# Patient Record
Sex: FEMALE | Race: WHITE | Employment: FULL TIME | URBAN - METROPOLITAN AREA
[De-identification: names, ages, dates, MRNs, and addresses within clinical notes are randomized per-mention and may not be internally consistent; named-entity substitution may affect disease eponyms.]

---

## 2019-10-23 LAB
ALBUMIN SERPL-MCNC: 4.5 G/DL (ref 3.4–5)
ALP BLD-CCNC: 45 U/L (ref 40–129)
ALT SERPL-CCNC: 14 U/L (ref 10–40)
ANION GAP SERPL CALCULATED.3IONS-SCNC: 15 MMOL/L (ref 3–16)
AST SERPL-CCNC: 16 U/L (ref 15–37)
BASOPHILS ABSOLUTE: 0 K/UL (ref 0–0.2)
BASOPHILS RELATIVE PERCENT: 0.4 %
BILIRUB SERPL-MCNC: <0.2 MG/DL (ref 0–1)
BILIRUBIN DIRECT: <0.2 MG/DL (ref 0–0.3)
BILIRUBIN, INDIRECT: NORMAL MG/DL (ref 0–1)
BUN BLDV-MCNC: 17 MG/DL (ref 7–20)
CALCIUM SERPL-MCNC: 9.7 MG/DL (ref 8.3–10.6)
CHLORIDE BLD-SCNC: 100 MMOL/L (ref 99–110)
CO2: 24 MMOL/L (ref 21–32)
CREAT SERPL-MCNC: 0.5 MG/DL (ref 0.6–1.1)
EOSINOPHILS ABSOLUTE: 0.1 K/UL (ref 0–0.6)
EOSINOPHILS RELATIVE PERCENT: 0.9 %
GFR AFRICAN AMERICAN: >60
GFR NON-AFRICAN AMERICAN: >60
GLUCOSE BLD-MCNC: 77 MG/DL (ref 70–99)
HCT VFR BLD CALC: 42.2 % (ref 36–48)
HEMOGLOBIN: 14.4 G/DL (ref 12–16)
LYMPHOCYTES ABSOLUTE: 1.1 K/UL (ref 1–5.1)
LYMPHOCYTES RELATIVE PERCENT: 20.5 %
MCH RBC QN AUTO: 29.4 PG (ref 26–34)
MCHC RBC AUTO-ENTMCNC: 34.2 G/DL (ref 31–36)
MCV RBC AUTO: 86 FL (ref 80–100)
MONOCYTES ABSOLUTE: 0.5 K/UL (ref 0–1.3)
MONOCYTES RELATIVE PERCENT: 9.1 %
NEUTROPHILS ABSOLUTE: 3.7 K/UL (ref 1.7–7.7)
NEUTROPHILS RELATIVE PERCENT: 69.1 %
PDW BLD-RTO: 12.7 % (ref 12.4–15.4)
PHOSPHORUS: 4 MG/DL (ref 2.5–4.9)
PLATELET # BLD: 277 K/UL (ref 135–450)
PLATELET SLIDE REVIEW: ADEQUATE
PMV BLD AUTO: 10.8 FL (ref 5–10.5)
POTASSIUM SERPL-SCNC: 4.5 MMOL/L (ref 3.5–5.1)
RBC # BLD: 4.91 M/UL (ref 4–5.2)
SLIDE REVIEW: ABNORMAL
SODIUM BLD-SCNC: 139 MMOL/L (ref 136–145)
TOTAL PROTEIN: 6.4 G/DL (ref 6.4–8.2)
WBC # BLD: 5.4 K/UL (ref 4–11)

## 2020-10-18 ENCOUNTER — HOSPITAL ENCOUNTER (EMERGENCY)
Age: 30
Discharge: HOME | End: 2020-10-18

## 2020-10-18 ENCOUNTER — APPOINTMENT (OUTPATIENT)
Dept: CT IMAGING | Age: 30
End: 2020-10-18

## 2020-10-18 VITALS
DIASTOLIC BLOOD PRESSURE: 66 MMHG | SYSTOLIC BLOOD PRESSURE: 115 MMHG | BODY MASS INDEX: 22.49 KG/M2 | TEMPERATURE: 98 F | WEIGHT: 135 LBS | OXYGEN SATURATION: 100 % | HEART RATE: 64 BPM | HEIGHT: 65 IN | RESPIRATION RATE: 12 BRPM

## 2020-10-18 LAB
A/G RATIO: 2 (ref 1.1–2.2)
ALBUMIN SERPL-MCNC: 3.9 G/DL (ref 3.4–5)
ALP BLD-CCNC: 40 U/L (ref 40–129)
ALT SERPL-CCNC: 11 U/L (ref 10–40)
ANION GAP SERPL CALCULATED.3IONS-SCNC: 9 MMOL/L (ref 3–16)
AST SERPL-CCNC: 22 U/L (ref 15–37)
BASOPHILS ABSOLUTE: 0.1 K/UL (ref 0–0.2)
BASOPHILS RELATIVE PERCENT: 1.1 %
BILIRUB SERPL-MCNC: <0.2 MG/DL (ref 0–1)
BILIRUBIN URINE: NEGATIVE
BLOOD, URINE: NEGATIVE
BUN BLDV-MCNC: 17 MG/DL (ref 7–20)
CALCIUM SERPL-MCNC: 9.1 MG/DL (ref 8.3–10.6)
CHLORIDE BLD-SCNC: 109 MMOL/L (ref 99–110)
CLARITY: CLEAR
CO2: 23 MMOL/L (ref 21–32)
COLOR: YELLOW
CREAT SERPL-MCNC: 0.7 MG/DL (ref 0.6–1.1)
EOSINOPHILS ABSOLUTE: 0 K/UL (ref 0–0.6)
EOSINOPHILS RELATIVE PERCENT: 0.5 %
GFR AFRICAN AMERICAN: >60
GFR NON-AFRICAN AMERICAN: >60
GLOBULIN: 2 G/DL
GLUCOSE BLD-MCNC: 99 MG/DL (ref 70–99)
GLUCOSE URINE: NEGATIVE MG/DL
HCG QUALITATIVE: NEGATIVE
HCT VFR BLD CALC: 25.7 % (ref 36–48)
HEMATOLOGY PATH CONSULT: YES
HEMOGLOBIN: 7.9 G/DL (ref 12–16)
HYPOCHROMIA: ABNORMAL
KETONES, URINE: NEGATIVE MG/DL
LEUKOCYTE ESTERASE, URINE: NEGATIVE
LIPASE: 22 U/L (ref 13–60)
LYMPHOCYTES ABSOLUTE: 1.3 K/UL (ref 1–5.1)
LYMPHOCYTES RELATIVE PERCENT: 15.5 %
MCH RBC QN AUTO: 21.3 PG (ref 26–34)
MCHC RBC AUTO-ENTMCNC: 30.7 G/DL (ref 31–36)
MCV RBC AUTO: 69.4 FL (ref 80–100)
MICROCYTES: ABNORMAL
MICROSCOPIC EXAMINATION: NORMAL
MONOCYTES ABSOLUTE: 0.8 K/UL (ref 0–1.3)
MONOCYTES RELATIVE PERCENT: 9.8 %
NEUTROPHILS ABSOLUTE: 6 K/UL (ref 1.7–7.7)
NEUTROPHILS RELATIVE PERCENT: 73.1 %
NITRITE, URINE: NEGATIVE
PDW BLD-RTO: 16.7 % (ref 12.4–15.4)
PH UA: 5.5 (ref 5–8)
PLATELET # BLD: 277 K/UL (ref 135–450)
PLATELET SLIDE REVIEW: ADEQUATE
PMV BLD AUTO: 9.9 FL (ref 5–10.5)
POLYCHROMASIA: ABNORMAL
POTASSIUM SERPL-SCNC: 4.2 MMOL/L (ref 3.5–5.1)
PROTEIN UA: NEGATIVE MG/DL
RBC # BLD: 3.7 M/UL (ref 4–5.2)
SLIDE REVIEW: ABNORMAL
SODIUM BLD-SCNC: 141 MMOL/L (ref 136–145)
SPECIFIC GRAVITY UA: >1.03 (ref 1–1.03)
TOTAL PROTEIN: 5.9 G/DL (ref 6.4–8.2)
URINE REFLEX TO CULTURE: NORMAL
URINE TYPE: NORMAL
UROBILINOGEN, URINE: 0.2 E.U./DL
WBC # BLD: 8.2 K/UL (ref 4–11)

## 2020-10-18 PROCEDURE — 84703 CHORIONIC GONADOTROPIN ASSAY: CPT

## 2020-10-18 PROCEDURE — 6360000004 HC RX CONTRAST MEDICATION: Performed by: PHYSICIAN ASSISTANT

## 2020-10-18 PROCEDURE — 2580000003 HC RX 258: Performed by: PHYSICIAN ASSISTANT

## 2020-10-18 PROCEDURE — 80053 COMPREHEN METABOLIC PANEL: CPT

## 2020-10-18 PROCEDURE — 99284 EMERGENCY DEPT VISIT MOD MDM: CPT

## 2020-10-18 PROCEDURE — 81003 URINALYSIS AUTO W/O SCOPE: CPT

## 2020-10-18 PROCEDURE — 85025 COMPLETE CBC W/AUTO DIFF WBC: CPT

## 2020-10-18 PROCEDURE — 96374 THER/PROPH/DIAG INJ IV PUSH: CPT

## 2020-10-18 PROCEDURE — 83690 ASSAY OF LIPASE: CPT

## 2020-10-18 PROCEDURE — 74177 CT ABD & PELVIS W/CONTRAST: CPT

## 2020-10-18 PROCEDURE — 6360000002 HC RX W HCPCS: Performed by: PHYSICIAN ASSISTANT

## 2020-10-18 RX ORDER — KETOROLAC TROMETHAMINE 30 MG/ML
30 INJECTION, SOLUTION INTRAMUSCULAR; INTRAVENOUS ONCE
Status: COMPLETED | OUTPATIENT
Start: 2020-10-18 | End: 2020-10-18

## 2020-10-18 RX ORDER — 0.9 % SODIUM CHLORIDE 0.9 %
1000 INTRAVENOUS SOLUTION INTRAVENOUS ONCE
Status: COMPLETED | OUTPATIENT
Start: 2020-10-18 | End: 2020-10-18

## 2020-10-18 RX ORDER — LEVOTHYROXINE SODIUM 137 UG/1
137 TABLET ORAL DAILY
COMMUNITY
Start: 2020-06-15

## 2020-10-18 RX ORDER — SULFASALAZINE 500 MG/1
1000 TABLET ORAL 4 TIMES DAILY
COMMUNITY

## 2020-10-18 RX ADMIN — SODIUM CHLORIDE 1000 ML: 9 INJECTION, SOLUTION INTRAVENOUS at 11:49

## 2020-10-18 RX ADMIN — IOPAMIDOL 75 ML: 755 INJECTION, SOLUTION INTRAVENOUS at 12:32

## 2020-10-18 RX ADMIN — KETOROLAC TROMETHAMINE 30 MG: 30 INJECTION, SOLUTION INTRAMUSCULAR at 11:49

## 2020-10-18 ASSESSMENT — PAIN DESCRIPTION - LOCATION: LOCATION: ABDOMEN

## 2020-10-18 ASSESSMENT — PAIN SCALES - GENERAL
PAINLEVEL_OUTOF10: 5
PAINLEVEL_OUTOF10: 8

## 2020-10-18 ASSESSMENT — PAIN DESCRIPTION - PAIN TYPE: TYPE: ACUTE PAIN

## 2020-10-18 ASSESSMENT — PAIN DESCRIPTION - ORIENTATION: ORIENTATION: RIGHT

## 2020-10-18 NOTE — ED PROVIDER NOTES
control use. Nursing Notes were all reviewed and agreed with or any disagreements were addressed in the HPI. REVIEW OF SYSTEMS    (2-9 systems for level 4, 10 or more for level 5)     Review of Systems    Positives and Pertinent negatives as per HPI. Except as noted above in the ROS, all other systems were reviewed and negative. PAST MEDICAL HISTORY     Past Medical History:   Diagnosis Date    Colitis     History of chicken pox 2/3/1994    Roseola 8/09    Thyroid disease          SURGICAL HISTORY     Past Surgical History:   Procedure Laterality Date    SHOULDER SURGERY           CURRENTMEDICATIONS       Discharge Medication List as of 10/18/2020  1:34 PM      CONTINUE these medications which have NOT CHANGED    Details   levothyroxine (SYNTHROID) 137 MCG tablet Take 137 mcg by mouth dailyHistorical Med      sulfaSALAzine (AZULFIDINE) 500 MG tablet Take 1,000 mg by mouth 4 times dailyHistorical Med      PROBIOTIC PRODUCT PO Take by mouthHistorical Med      COLOSTRUM PO Take by mouthHistorical Med      Nutritional Supplements (PERMEABILITY FACTORS PO) Take by mouthHistorical Med      NONFORMULARY Gi select , powder for inflammationsHistorical Med      azithromycin (ZITHROMAX Z-AILEEN) 250 MG tablet Take per directions, Disp-1 packet,R-0Normal      Norgestim-Eth Estrad Triphasic (TRINESSA, 28,) 0.18/0.215/0.25 MG-35 MCG TABS Take  by mouth. fluticasone (FLONASE) 50 MCG/ACT nasal spray 2 puffs each nostril every day before bedtime. , Disp-3 Bottle, R-3      montelukast (SINGULAIR) 10 MG tablet Take 1 tablet by mouth nightly., Disp-90 tablet, R-3               ALLERGIES     Patient has no known allergies. FAMILYHISTORY     No family history on file.        SOCIAL HISTORY       Social History     Tobacco Use    Smoking status: Never Smoker    Smokeless tobacco: Never Used   Substance Use Topics    Alcohol use: No    Drug use: No       SCREENINGS             PHYSICAL EXAM    (up to 7 for level 4, 8 or more for level 5)     ED Triage Vitals [10/18/20 1114]   BP Temp Temp src Pulse Resp SpO2 Height Weight   (!) 131/91 97.9 °F (36.6 °C) -- 91 12 100 % 5' 5\" (1.651 m) 135 lb (61.2 kg)       Physical Exam  Vitals signs and nursing note reviewed. Constitutional:       Appearance: She is well-developed. She is not diaphoretic. HENT:      Head: Atraumatic. Nose: Nose normal.   Eyes:      General:         Right eye: No discharge. Left eye: No discharge. Neck:      Musculoskeletal: Normal range of motion. Cardiovascular:      Rate and Rhythm: Normal rate and regular rhythm. Heart sounds: No murmur. No friction rub. No gallop. Pulmonary:      Effort: Pulmonary effort is normal. No respiratory distress. Breath sounds: No stridor. No wheezing, rhonchi or rales. Abdominal:      General: Bowel sounds are normal. There is no distension. Palpations: Abdomen is soft. There is no mass. Tenderness: There is no abdominal tenderness. There is no guarding or rebound. Hernia: No hernia is present. Comments: Generalized subjective tenderness without guarding, rebound or rigidity   Musculoskeletal: Normal range of motion. General: No swelling. Skin:     General: Skin is warm and dry. Findings: No erythema or rash. Neurological:      Mental Status: She is alert and oriented to person, place, and time. Cranial Nerves: No cranial nerve deficit.    Psychiatric:         Behavior: Behavior normal.         DIAGNOSTIC RESULTS   LABS:    Labs Reviewed   CBC WITH AUTO DIFFERENTIAL - Abnormal; Notable for the following components:       Result Value    RBC 3.70 (*)     Hemoglobin 7.9 (*)     Hematocrit 25.7 (*)     MCV 69.4 (*)     MCH 21.3 (*)     MCHC 30.7 (*)     RDW 16.7 (*)     Microcytes Occasional (*)     Polychromasia Occasional (*)     Hypochromia Occasional (*)     All other components within normal limits    Narrative:     Performed at:  [facility]) - [facility]  [address], [address]   Phone [phone]   COMPREHENSIVE METABOLIC PANEL - Abnormal; Notable for the following components: Total Protein 5.9 (*)     All other components within normal limits    Narrative:     Performed at:  [facility]  [address], [address]   Phone [phone]   LIPASE    Narrative:     Performed at:  [facility]  [address]   Phone [phone]   URINE RT REFLEX TO CULTURE    Narrative:     Performed at:  [facility]  [address],  [place], [address]   Phone [phone]   HCG, SERUM, QUALITATIVE    Narrative:     Performed at:  [facility]  [address] [facility],  [place], [address]   Phone [phone]       All other labs were within normal range or not returned as of this dictation. EKG: All EKG's are interpreted by the Emergency Department Physician in the absence of a cardiologist.  Please see their note for interpretation of EKG. RADIOLOGY:   Non-plain film images such as CT, Ultrasound and MRI are read by the radiologist. Plain radiographic images are visualized and preliminarily interpreted by the ED Provider with the below findings:        Interpretation per the Radiologist below, if available at the time of this note:    CT ABDOMEN PELVIS W IV CONTRAST Additional Contrast? None   Final Result   Calcifications at the base of the bladder favored to represent phleboliths. Distal ureteral stone cannot be completely excluded. Kidneys and renal collecting systems appear unremarkable. Gallbladder is contracted and otherwise unremarkable. There is a heavy stool burden.   Appendix is visualized and appears normal.      No definite inflammatory change noted of the GI tract; however, there is   fluid within the pelvis somewhat limiting evaluation. Fluid within the pelvis is felt to be gynecologic in origin likely   physiologic. If patient has pelvic pain follow-up pelvic ultrasound could   always be considered. No results found. PROCEDURES   Unless otherwise noted below, none     Procedures    CRITICAL CARE TIME   N/A    CONSULTS:  None      EMERGENCY DEPARTMENT COURSE and DIFFERENTIAL DIAGNOSIS/MDM:   Vitals:    Vitals:    10/18/20 1114 10/18/20 1330   BP: (!) 131/91 115/66   Pulse: 91 64   Resp: 12 12   Temp: 97.9 °F (36.6 °C) 98 °F (36.7 °C)   SpO2: 100% 100%   Weight: 135 lb (61.2 kg)    Height: 5' 5\" (1.651 m)        Patient was given the following medications:  Medications   0.9 % sodium chloride bolus (0 mLs Intravenous Stopped 10/18/20 1334)   ketorolac (TORADOL) injection 30 mg (30 mg Intravenous Given 10/18/20 1149)   iopamidol (ISOVUE-370) 76 % injection 75 mL (75 mLs Intravenous Given 10/18/20 1232)       PERC Rule:  Applicable in this patient who has low clinical suspicion for pulmonary embolism. Age < 48years old: Yes  Heart rate < 100 bpm: Yes  Oxygen saturation > 95%: Yes  Hemoptysis: No  Exogenous estrogen use: No  Prior history of DVT or PE: No  Unilateral leg swelling: No  Surgery or significant trauma in the past 4 weeks: No    Based on the above, PE can effectively be ruled out without further testing. Patient presented with some pleuritic epigastric and upper abdominal pain that radiated around her flank that started suddenly while she was giving her child a bath this morning. After Toradol and recheck patient denies any pain has a repeat benign abdominal exam.  She has known history of ulcerative colitis and has chronic bloody stools but states that this may have been worse over the past 1 month or so. She does have some anemia with a hemoglobin of 7.9 but is not dizzy and vitals are unremarkable. She is never required blood transfusion. This is been ongoing for a month.   She is repeat benign abdominal exam.  There is question of possible phlebolith versus kidney stone given her sudden onset of symptoms with some flank pain initial concern was for possible kidney stone. She was educated on the possibility of this but given that she is pain-free she can follow-up as an outpatient. She will follow-up with her GI doctor given her increased bleeding and anemia. We will follow-up with urology. And return here for any worsening of symptoms or problems at home. Low suspicion for acute cholecystitis, pancreatitis, obstruction, perforated viscus, diverticulitis or other emergent etiology. She understood if at any point she begins to have fevers, constant vomiting or feeling dizzy like she may pass out she would need to return immediately to the emergency department. She was stable time of discharge. FINAL IMPRESSION      1. Pain of upper abdomen    2.  Anemia, unspecified type          DISPOSITION/PLAN   DISPOSITION        PATIENT REFERREDTO:  Conrad Valencia MD  [address] [place]  [phone]    Schedule an appointment as soon as possible for a visit   3-5 days, As needed    [facility] Emergency Department  [address] [place] [address]  [phone]    As needed      DISCHARGE MEDICATIONS:  Discharge Medication List as of 10/18/2020  1:34 PM          DISCONTINUED MEDICATIONS:  Discharge Medication List as of 10/18/2020  1:34 PM                 (Please note that portions of this note were completed with a voice recognition program.  Efforts were made to edit the dictations but occasionally words are mis-transcribed.)    Noy Espinal PA-C (electronically signed)            Noy Espinal PA-C  10/18/20 [address] [facility] CARLOS [place]  11/02/20 1817

## 2020-10-19 LAB — HEMATOLOGY PATH CONSULT: NORMAL

## 2020-10-23 NOTE — PROGRESS NOTES
Patient not reached. Preop instructions left on voice mail.  [phone]______________    -[id number]__[id number]______[id number]___________  -Nothing to eat or drink after midnight  -Responsible adult 25 or older to stay on site while you are here and drive you home and stay with you after  -Follow any instructions your doctors office has given you  -Bring a complete list of all your medications and supplements  -If you normally take the following medications in the morning please do so with a small    sip of water-heart,blood pressure,seizure,breathing or thyroid-avoid water pilll Do not take blood pressure medications ending in \"monico\" or \"pril\" the AM of surgery or the enrique prior  -You may use your inhalers  -Take half of your normal dose of any long acting insulins the night before-do not take    any diabetic medications in the morning  -Follow your doctors instructions regarding blood thinners  -Any questions call your surgeons office  Anesthesia attempts to review all Endo charts prior to surgery and will place any PAT orders,Surgery patients will have orders placed based on history in chart which may not be complete  ENDOSCOPY PATIENTS ONLY-FOLLOW YOUR DOCTORS BOWEL PREP INSTRUCTIONS,THIS MAY INCLUDE TAKING A SECOND PORTION OF YOUR PREP AFTER MIDNIGHT

## 2020-10-26 ENCOUNTER — HOSPITAL ENCOUNTER (OUTPATIENT)
Age: 30
Setting detail: OUTPATIENT SURGERY
Discharge: HOME | End: 2020-10-26
Attending: INTERNAL MEDICINE | Admitting: INTERNAL MEDICINE

## 2020-10-26 ENCOUNTER — ANESTHESIA (OUTPATIENT)
Dept: ENDOSCOPY | Age: 30
End: 2020-10-26

## 2020-10-26 ENCOUNTER — ANESTHESIA EVENT (OUTPATIENT)
Dept: ENDOSCOPY | Age: 30
End: 2020-10-26

## 2020-10-26 VITALS
OXYGEN SATURATION: 100 % | RESPIRATION RATE: 14 BRPM | HEIGHT: 65 IN | TEMPERATURE: 97.5 F | WEIGHT: 135 LBS | BODY MASS INDEX: 22.49 KG/M2 | HEART RATE: 67 BPM | DIASTOLIC BLOOD PRESSURE: 60 MMHG | SYSTOLIC BLOOD PRESSURE: 93 MMHG

## 2020-10-26 LAB
C DIFF TOXIN/ANTIGEN: NORMAL
HCG QUALITATIVE: POSITIVE
HCG(URINE) PREGNANCY TEST: POSITIVE
SARS-COV-2, NAAT: NOT DETECTED

## 2020-10-26 PROCEDURE — 3609010300 HC COLONOSCOPY W/BIOPSY SINGLE/MULTIPLE: Performed by: INTERNAL MEDICINE

## 2020-10-26 PROCEDURE — 36415 COLL VENOUS BLD VENIPUNCTURE: CPT

## 2020-10-26 PROCEDURE — 87505 NFCT AGENT DETECTION GI: CPT

## 2020-10-26 PROCEDURE — 2580000003 HC RX 258: Performed by: ANESTHESIOLOGY

## 2020-10-26 PROCEDURE — 2709999900 HC NON-CHARGEABLE SUPPLY: Performed by: INTERNAL MEDICINE

## 2020-10-26 PROCEDURE — 88305 TISSUE EXAM BY PATHOLOGIST: CPT

## 2020-10-26 PROCEDURE — 88342 IMHCHEM/IMCYTCHM 1ST ANTB: CPT

## 2020-10-26 PROCEDURE — 87449 NOS EACH ORGANISM AG IA: CPT

## 2020-10-26 PROCEDURE — 7100000011 HC PHASE II RECOVERY - ADDTL 15 MIN: Performed by: INTERNAL MEDICINE

## 2020-10-26 PROCEDURE — 87324 CLOSTRIDIUM AG IA: CPT

## 2020-10-26 PROCEDURE — U0002 COVID-19 LAB TEST NON-CDC: HCPCS

## 2020-10-26 PROCEDURE — 7100000010 HC PHASE II RECOVERY - FIRST 15 MIN: Performed by: INTERNAL MEDICINE

## 2020-10-26 PROCEDURE — 84703 CHORIONIC GONADOTROPIN ASSAY: CPT

## 2020-10-26 RX ORDER — PNV NO.95/FERROUS FUM/FOLIC AC 28MG-0.8MG
TABLET ORAL 2 TIMES DAILY
COMMUNITY

## 2020-10-26 RX ORDER — SODIUM CHLORIDE 9 MG/ML
INJECTION, SOLUTION INTRAVENOUS CONTINUOUS
Status: DISCONTINUED | OUTPATIENT
Start: 2020-10-26 | End: 2020-10-26 | Stop reason: HOSPADM

## 2020-10-26 RX ADMIN — SODIUM CHLORIDE: 9 INJECTION, SOLUTION INTRAVENOUS at 08:25

## 2020-10-26 ASSESSMENT — PAIN - FUNCTIONAL ASSESSMENT: PAIN_FUNCTIONAL_ASSESSMENT: 0-10

## 2020-10-26 ASSESSMENT — LIFESTYLE VARIABLES: SMOKING_STATUS: 0

## 2020-10-26 NOTE — ANESTHESIA POSTPROCEDURE EVALUATION
Department of Anesthesiology  Postprocedure Note    Patient: Esteban Zhong  MRN: [id number]  YOB: 1990  Date of evaluation: 10/26/2020  Time:  8:33 AM     Procedure Summary     Date:  10/26/20 Room / Location:  [address] [place] [address]    Anesthesia Start:   Anesthesia Stop:      Procedures:       COLONOSCOPY DIAGNOSTIC (N/A )      EGD DIAGNOSTIC ONLY (N/A Abdomen) Diagnosis:       (IRON DEFICIENCY ANEMIA D50.9)      (CONSTIPATION K59.00)    Surgeon:  Tiffany Lopez MD Responsible Provider:      Anesthesia Type:  MAC ASA Status:  2          Anesthesia Type: No value filed. Octavia Phase I: Octavia Score: 10    Octavia Phase II:      Last vitals: Reviewed and per EMR flowsheets. Anesthesia Post Evaluation    Comments: Patient urine and serum HCG positive, did not receive anesthetic services.

## 2020-10-26 NOTE — H&P
[address] [place] [address]  GI Consult Note    Patient: Ab Dos Santos  : 1990  Acct#:      Date:  10/26/2020    Subjective:       History of Present Illness  Patient is a 31 y.o.  female with hbg 9 with hx uc. Planned egd/colon but pt found pregnant in preop eval so now just proceeding unsedated colonoscopy vs flex sig. Past Medical History:   Diagnosis Date    Anemia     Colitis     Constipation     History of chicken pox 2/3/1994    Roseola     Thyroid disease     Ulcerative colitis ([facility] [address]       Past Surgical History:   Procedure Laterality Date    ADENOIDECTOMY      SHOULDER SURGERY      SINUS SURGERY      TONSILLECTOMY          Admission Meds  No current facility-administered medications on file prior to encounter. Current Outpatient Medications on File Prior to Encounter   Medication Sig Dispense Refill    Ferrous Sulfate (IRON) 325 (65 Fe) MG TABS Take by mouth 2 times daily      levothyroxine (SYNTHROID) 137 MCG tablet Take 137 mcg by mouth daily      sulfaSALAzine (AZULFIDINE) 500 MG tablet Take 1,000 mg by mouth 4 times daily      PROBIOTIC PRODUCT PO Take by mouth daily       COLOSTRUM PO Take by mouth 2 times daily       Nutritional Supplements (PERMEABILITY FACTORS PO) Take by mouth      NONFORMULARY Gi select , powder for inflammations      azithromycin (ZITHROMAX Z-AILEEN) 250 MG tablet Take per directions 1 packet 0    Norgestim-Eth Estrad Triphasic (TRINESSA, 28,) 0.18/0.215/0.25 MG-35 MCG TABS Take  by mouth.  fluticasone (FLONASE) 50 MCG/ACT nasal spray 2 puffs each nostril every day before bedtime. 3 Bottle 3    montelukast (SINGULAIR) 10 MG tablet Take 1 tablet by mouth nightly. 90 tablet 3         Allergies  No Known Allergies     Family history   History reviewed. No pertinent family history. Social   Social History     Tobacco Use    Smoking status: Never Smoker    Smokeless tobacco: Never Used   Substance Use Topics    Alcohol use:  No Physical Exam  Blood pressure (!) 104/56, pulse 75, temperature 98.6 °F (37 °C), temperature source Temporal, resp. rate 16, height 5' 5\" (1.651 m), weight 135 lb (61.2 kg), last menstrual period 09/30/2020, SpO2 98 %, not currently breastfeeding. General appearance: alert, cooperative, no distress, appears stated age  Anicteric, No Jaundice  Head: Normocephalic, without obvious abnormality  Lungs: clear to auscultation bilaterally  Heart: regular rate and rhythm  Abdomen: soft, mild sore, non-distended, Bowel sounds normal.    Extremities: no edema  Skin: warm and dry  Neuro: alert and oriented      Data Review:    No results for input(s): WBC, HGB, HCT, MCV, PLT in the last 72 hours. No results for input(s): NA, K, CL, CO2, PHOS, BUN, CREATININE in the last 72 hours. Invalid input(s): CA  No results for input(s): AST, ALT, ALB, BILIDIR, BILITOT, ALKPHOS in the last 72 hours. No results for input(s): LIPASE, AMYLASE in the last 72 hours. No results for input(s): PROTIME, INR in the last 72 hours. No results for input(s): PTT in the last 72 hours. No results for input(s): OCCULTBLD in the last 72 hours. Assessment / Plan :    1.gi: unsedated colonoscopy as above.         David Mcginnis MD  [facility] [address] [facility]

## 2020-10-26 NOTE — ANESTHESIA PRE PROCEDURE
Department of Anesthesiology  Preprocedure Note       Name:  Cecelia Robert   Age:  31 y.o.  :  1990                                          MRN:  [id number]         Date:  10/26/2020      Surgeon: Yesika Parker):  David Mcginnis MD    Procedure: Procedure(s):  COLONOSCOPY DIAGNOSTIC  EGD DIAGNOSTIC ONLY    Medications prior to admission:   Prior to Admission medications    Medication Sig Start Date End Date Taking? Authorizing Provider   levothyroxine (SYNTHROID) 137 MCG tablet Take 137 mcg by mouth daily 6/15/20   Historical Provider, MD   sulfaSALAzine (AZULFIDINE) 500 MG tablet Take 1,000 mg by mouth 4 times daily    Historical Provider, MD   PROBIOTIC PRODUCT PO Take by mouth    Historical Provider, MD   COLOSTRUM PO Take by mouth    Historical Provider, MD   Nutritional Supplements (PERMEABILITY FACTORS PO) Take by mouth    Historical Provider, MD   NONFORMULARY Gi select , powder for inflammations    Historical Provider, MD   azithromycin (ZITHROMAX Z-AILEEN) 250 MG tablet Take per directions 14   Delphine Hernández MD   Norgestim-Eth Estrad Triphasic (TRINESSA, 28,) 0.18/0.215/0.25 MG-35 MCG TABS Take  by mouth. Historical Provider, MD   fluticasone (FLONASE) 50 MCG/ACT nasal spray 2 puffs each nostril every day before bedtime. 3/2/12   Delphine Hernández MD   montelukast (SINGULAIR) 10 MG tablet Take 1 tablet by mouth nightly. 11   Delphine Hernández MD       Current medications:    No current facility-administered medications for this encounter.         Allergies:  No Known Allergies    Problem List:    Patient Active Problem List   Diagnosis Code    History of chicken pox Z86.19       Past Medical History:        Diagnosis Date    Colitis     History of chicken pox 2/3/1994    Roseola     Thyroid disease        Past Surgical History:        Procedure Laterality Date    SHOULDER SURGERY         Social History:    Social History     Tobacco Use    Smoking status: Never Smoker    Smokeless tobacco: Never Used   Substance Use Topics    Alcohol use: No                                Counseling given: Not Answered      Vital Signs (Current): There were no vitals filed for this visit. BP Readings from Last 3 Encounters:   10/18/20 115/66   12/12/14 102/68   11/27/12 116/68       NPO Status:                                                                                 BMI:   Wt Readings from Last 3 Encounters:   10/18/20 135 lb (61.2 kg)   12/12/14 130 lb 12.8 oz (59.3 kg)   11/27/12 138 lb 6.4 oz (62.8 kg)     There is no height or weight on file to calculate BMI.    CBC:   Lab Results   Component Value Date    WBC 8.2 10/18/2020    RBC 3.70 10/18/2020    HGB 7.9 10/18/2020    HCT 25.7 10/18/2020    MCV 69.4 10/18/2020    RDW 16.7 10/18/2020     10/18/2020       CMP:   Lab Results   Component Value Date     10/18/2020    K 4.2 10/18/2020     10/18/2020    CO2 23 10/18/2020    BUN 17 10/18/2020    CREATININE 0.7 10/18/2020    GFRAA >60 10/18/2020    GFRAA >60 08/24/2010    AGRATIO 2.0 10/18/2020    LABGLOM >60 10/18/2020    GLUCOSE 99 10/18/2020    PROT 5.9 10/18/2020    CALCIUM 9.1 10/18/2020    BILITOT <0.2 10/18/2020    ALKPHOS 40 10/18/2020    AST 22 10/18/2020    ALT 11 10/18/2020       POC Tests: No results for input(s): POCGLU, POCNA, POCK, POCCL, POCBUN, POCHEMO, POCHCT in the last 72 hours.     Coags: No results found for: PROTIME, INR, APTT    HCG (If Applicable): No results found for: PREGTESTUR, PREGSERUM, HCG, HCGQUANT     ABGs: No results found for: PHART, PO2ART, QTI5NEW, FXA1MIA, BEART, U3CTGUND     Type & Screen (If Applicable):  No results found for: LABABO, LABRH    Drug/Infectious Status (If Applicable):  No results found for: HIV, HEPCAB    COVID-19 Screening (If Applicable): No results found for: COVID19      Anesthesia Evaluation  Patient summary reviewed and Nursing notes reviewed no history of anesthetic

## 2020-10-26 NOTE — PROGRESS NOTES
Pt arrived from Endo to PACU bay 8. Patient had no sedation. Denies pain. Report received from Endo staff. RA, NSR, VSS. Will continue to monitor.

## 2020-10-27 LAB — GI BACTERIAL PATHOGENS BY PCR: NORMAL

## 2021-01-21 ENCOUNTER — OFFICE VISIT (OUTPATIENT)
Dept: PRIMARY CARE CLINIC | Age: 31
End: 2021-01-21

## 2021-01-21 DIAGNOSIS — Z20.828 EXPOSURE TO SARS-ASSOCIATED CORONAVIRUS: Primary | ICD-10-CM

## 2021-01-21 PROCEDURE — 99211 OFF/OP EST MAY X REQ PHY/QHP: CPT | Performed by: NURSE PRACTITIONER

## 2021-01-21 NOTE — PATIENT INSTRUCTIONS

## 2021-01-21 NOTE — PROGRESS NOTES
Faheem Segura received a viral test for COVID-19. They were educated on isolation and quarantine as appropriate. For any symptoms, they were directed to seek care from their PCP, given contact information to establish with a doctor, directed to an urgent care or the emergency room.

## 2021-01-22 LAB — SARS-COV-2: NOT DETECTED

## (undated) DEVICE — BW-412T DISP COMBO CLEANING BRUSH: Brand: SINGLE USE COMBINATION CLEANING BRUSH

## (undated) DEVICE — SET VLV 3 PC AWS DISPOSABLE GRDIAN SCOPEVALET

## (undated) DEVICE — SOLUTION IV IRRIG WATER 500ML POUR BRL ST 2F7113

## (undated) DEVICE — SPECIMEN TRAP: Brand: ARGYLE

## (undated) DEVICE — PROCEDURE KIT ENDOSCP CUST

## (undated) DEVICE — FORCEPS BX L240CM WRK CHN 2.8MM STD CAP W/ NDL MIC MESH

## (undated) DEVICE — MOUTHPIECE ENDOSCP L CTRL OPN AND SIDE PORTS DISP

## (undated) DEVICE — GOWN AURORA NONREINF LG: Brand: MEDLINE INDUSTRIES, INC.